# Patient Record
Sex: FEMALE | Race: WHITE | Employment: FULL TIME | URBAN - METROPOLITAN AREA
[De-identification: names, ages, dates, MRNs, and addresses within clinical notes are randomized per-mention and may not be internally consistent; named-entity substitution may affect disease eponyms.]

---

## 2018-09-24 ENCOUNTER — THERAPY VISIT (OUTPATIENT)
Dept: PHYSICAL THERAPY | Facility: CLINIC | Age: 33
End: 2018-09-24
Payer: COMMERCIAL

## 2018-09-24 DIAGNOSIS — M25.579 PAIN IN JOINT, ANKLE AND FOOT, UNSPECIFIED LATERALITY: Primary | ICD-10-CM

## 2018-09-24 PROCEDURE — 97161 PT EVAL LOW COMPLEX 20 MIN: CPT | Mod: GP | Performed by: PHYSICAL THERAPIST

## 2018-09-24 PROCEDURE — 97530 THERAPEUTIC ACTIVITIES: CPT | Mod: GP | Performed by: PHYSICAL THERAPIST

## 2018-09-24 PROCEDURE — 97110 THERAPEUTIC EXERCISES: CPT | Mod: GP | Performed by: PHYSICAL THERAPIST

## 2018-09-24 NOTE — PROGRESS NOTES
[facility] Initial Evaluation  Subjective:  Patient is a 33 year old female presenting with rehab left ankle/foot hpi.   Laverne Corral is a 33 year old female with a bilateral ankles condition.  Condition occurred with:  Repetition/overuse.  Condition occurred: during recreation/sport.  This is a chronic condition  Pt presents to PT with c/o B ankle pain.  Pt reports she was diagnosed with ankle instability and reports the MD told her she needs surgery.   She reports both the knees and ankles swell.   Pt reports the problem started in June 2018.     Pt reports she has been avoiding running and jumping because of the pain.   Pt reports she also sees a chiro who rolls out ITBand which helps everything, but just gives temporary relief.     Pt is a  at [employer].       MRI: B ankle - pt does not remember the results    PMH:  Thryoid problems, R ankle sprain.    Site of Pain: B lateral ankle pain, distal quad pain    Radiates to: n/a.  Pain is described as aching and is intermittent and reported as 2/10.  Associated symptoms:  Buckling/giving out, loss of motion/stiffness and loss of strength. Pain is the same all the time.  Exacerbated by: standing after being seated for a while, going to stairs, sports. and relieved by activity/movement and rest.  Since onset symptoms are gradually improving.  Special tests:  MRI.  Previous treatment includes chiropractic.  There was mild improvement following previous treatment.  General health as reported by patient is good.                                              Objective:  System    Ankle/Foot Evaluation  ROM:  AROM is normal.Prom wnl ankle: Eversion limited L>R.      Strength:      Plantarflexion: Left: 3+/5   Pain:   Right: 3+/5  Pain:                  Strength wnl ankle: weakness all directions.            FUNCTIONAL TESTS: Functional test ankle: Squat decreased DF B.  SLS R>L unsteadiness 15s B.  SL Squat R>L prontation  - calf tightness B.                                                       Hip Evaluation    Hip Strength:      Extension:  Left: 4-/5  Pain:Right: 4-/5    Pain:    Abduction:  Left: 4-/5     Pain:Right: 3+/5    Pain:                                     General Evaluation:                              Gait:  Gait wnl general: Amb without AD with L>R pronation.                                         ROS    Assessment/Plan:    Patient is a 33 year old female with both sides ankle complaints.    Patient has the following significant findings with corresponding treatment plan.                Diagnosis 1:  B ankle pain  Pain -  hot/cold therapy  Decreased ROM/flexibility - manual therapy and therapeutic exercise  Decreased joint mobility - manual therapy and therapeutic exercise  Decreased strength - therapeutic exercise and therapeutic activities  Impaired balance - neuro re-education and therapeutic activities  Decreased proprioception - neuro re-education and therapeutic activities  Inflammation - cold therapy  Impaired gait - gait training  Impaired muscle performance - neuro re-education  Decreased function - therapeutic activities  Impaired posture - neuro re-education    Therapy Evaluation Codes:   1) History comprised of:   Personal factors that impact the plan of care:      None.    Comorbidity factors that impact the plan of care are:      None.     Medications impacting care: None.  2) Examination of Body Systems comprised of:   Body structures and functions that impact the plan of care:      Ankle.   Activity limitations that impact the plan of care are:      Running, Sports, Squatting/kneeling, Stairs, Standing and Walking.  3) Clinical presentation characteristics are:   Stable/Uncomplicated.  4) Decision-Making    Low complexity using standardized patient assessment instrument and/or measureable assessment of functional outcome.  Cumulative Therapy Evaluation is: Low complexity.    Previous and current  functional limitations:  (See Goal Flow Sheet for this information)    Short term and Long term goals: (See Goal Flow Sheet for this information)     Communication ability:  Patient appears to be able to clearly communicate and understand verbal and written communication and follow directions correctly.  Treatment Explanation - The following has been discussed with the patient:   RX ordered/plan of care  Anticipated outcomes  Possible risks and side effects  This patient would benefit from PT intervention to resume normal activities.   Rehab potential is excellent.    Frequency:  1 X week, once daily  Duration:  for 6 weeks  Discharge Plan:  Achieve all LTG.  Independent in home treatment program.  Return to previous functional level by discharge.  Reach maximal therapeutic benefit.    Please refer to the daily flowsheet for treatment today, total treatment time and time spent performing 1:1 timed codes.

## 2018-09-24 NOTE — MR AVS SNAPSHOT
After Visit Summary   9/24/2018    Laverne Corral    MRN: [id number]           Patient Information     Date Of Birth          1985        Visit Information        Provider Department      9/24/2018 8:10 AM Allan Monroy, PT [facility] [address] Physical Therapy        Today's Diagnoses     Pain in joint, ankle and foot, unspecified laterality    -  1       Follow-ups after your visit        Your next 10 appointments already scheduled     Oct 12, 2018  7:30 AM CDT   SALIMA Extremity with Allan Monroy PT   [facility] [address] Physical Therapy ([facility]  )    [address] [place] [address]   [phone]            Oct 19, 2018  7:30 AM CDT   SALIMA Extremity with Allan Monroy PT   [facility] Physical Therapy ([facility]  )    [address] [place] [id number] [phone]            Oct 26, 2018  7:30 AM CDT   SALIMA Extremity with Allan Monroy PT   [facility] Physical Therapy ([facility]  )    [address] [place] [address]   [phone]              Who to contact     If you have questions or need follow up information about today's clinic visit or your schedule please contact [facility] PHYSICAL THERAPY directly at [phone].  Normal or non-critical lab and imaging results will be communicated to you by MyChart, letter or phone within 4 business days after the clinic has received the results. If you do not hear from us within 7 days, please contact the clinic through MyChart or phone. If you have a critical or abnormal lab result, we will notify you by phone as soon as possible.  Submit refill requests through [employer] or call your pharmacy and they will forward the refill request to us. Please allow 3 business days for your refill to be completed.           Additional Information About Your Visit        Care EveryWhere ID     This is your Care EveryWhere ID. This could be used by other organizations to access your [facility] medical records  [id number]         Blood Pressure from Last 3 Encounters:   No data found for BP    Weight from Last 3 Encounters:   No data found for Wt              We Performed the Following     PT Eval, Low Complexity (48662)     Therapeutic Activities     Therapeutic Exercises        Primary Care Provider Office Phone # Fax #    [facility] [phone]       [address] [place] [address]        Equal Access to Services     NGUYEN HECTOR : Hadii aad ku hadasho Soomaali, waaxda luqadaha, qaybta kaalmada adeegyada, waxay idiin hayaan adeeg leslieararosey colbert . So [facility] [phone].    ATENCIÓN: Si habla español, tiene a haile disposición servicios gratuitos de asistencia lingüística. Llame al [phone].    We comply with applicable federal civil rights laws and [place] laws. We do not discriminate on the basis of race, color, national origin, age, disability, sex, sexual orientation, or gender identity.            Thank you!     Thank you for choosing [facility] PHYSICAL THERAP[facility]  for your care. Our goal is always to provide you with excellent care. Hearing back from our patients is one way we can continue to improve our services. Please take a few minutes to complete the written survey that you may receive in the mail after your visit with us. Thank you!             Your Updated Medication List - Protect others around you: Learn how to safely use, store and throw away your medicines at www.disposemymeds.org.      Notice  As of 9/24/2018  9:03 AM    You have not been prescribed any medications.

## 2018-10-19 ENCOUNTER — THERAPY VISIT (OUTPATIENT)
Dept: PHYSICAL THERAPY | Facility: CLINIC | Age: 33
End: 2018-10-19
Payer: COMMERCIAL

## 2018-10-19 DIAGNOSIS — M25.579 PAIN IN JOINT, ANKLE AND FOOT, UNSPECIFIED LATERALITY: ICD-10-CM

## 2018-10-19 PROCEDURE — 97530 THERAPEUTIC ACTIVITIES: CPT | Mod: GP | Performed by: PHYSICAL THERAPIST

## 2018-10-19 PROCEDURE — 97110 THERAPEUTIC EXERCISES: CPT | Mod: GP | Performed by: PHYSICAL THERAPIST

## 2018-10-19 PROCEDURE — 97112 NEUROMUSCULAR REEDUCATION: CPT | Mod: GP | Performed by: PHYSICAL THERAPIST

## 2018-10-19 NOTE — MR AVS SNAPSHOT
After Visit Summary   10/19/2018    Laverne Corral    MRN: [id number]           Patient Information     Date Of Birth          1985        Visit Information        Provider Department      10/19/2018 7:30 AM Allan Monroy, PT [facility] Therapy        Today's Diagnoses     Pain in joint, ankle and foot, unspecified laterality           Follow-ups after your visit        Your next 10 appointments already scheduled     Oct 26, 2018  7:30 AM CDT   SALIMA Extremity with Allan Monroy PT   [facility] ([facility]  )    [address] [facility] [address] [phone]              Who to contact     If you have questions or need follow up information about today's clinic visit or your schedule please contact [facility] PHYSICAL THERAPY directly at [phone].  Normal or non-critical lab and imaging results will be communicated to you by MyChart, letter or phone within 4 business days after the clinic has received the results. If you do not hear from us within 7 days, please contact the clinic through MyChart or phone. If you have a critical or abnormal lab result, we will notify you by phone as soon as possible.  Submit refill requests through [employer] or call your pharmacy and they will forward the refill request to us. Please allow 3 business days for your refill to be completed.          Additional Information About Your Visit        Care EveryWhere ID     This is your Care EveryWhere ID. This could be used by other organizations to access your [facility] medical records  [id number]         Blood Pressure from Last 3 Encounters:   No data found for BP    Weight from Last 3 Encounters:   No data found for Wt              We Performed the Following     Neuromuscular Re-Education     Therapeutic Activities     Therapeutic Exercises        Primary  Care Provider Office Phone # Fax #    [facility] [phone]       [address] [place] [address]        Equal Access to Services     NGUYEN HECTOR : Hadii aad ku hademrestar Oh, berta ana rosaadaha, cadenta kaalmada calin, dipika dowellherminio sanchezjeromy turner filemon cruz. So [facility] [phone].    ATENCIÓN: Si habla español, tiene a haile disposición servicios gratuitos de asistencia lingüística. Llame al [phone].    We comply with applicable federal civil rights laws and [place] laws. We do not discriminate on the basis of race, color, national origin, age, disability, sex, sexual orientation, or gender identity.            Thank you!     Thank you for choosing [facility]ICAL THERAP[facility]  for your care. Our goal is always to provide you with excellent care. Hearing back from our patients is one way we can continue to improve our services. Please take a few minutes to complete the written survey that you may receive in the mail after your visit with us. Thank you!             Your Updated Medication List - Protect others around you: Learn how to safely use, store and throw away your medicines at www.disposemymeds.org.      Notice  As of 10/19/2018  8:08 AM    You have not been prescribed any medications.

## 2019-08-19 PROBLEM — M25.579 PAIN IN JOINT, ANKLE AND FOOT, UNSPECIFIED LATERALITY: Status: RESOLVED | Noted: 2018-09-24 | Resolved: 2019-08-19
